# Patient Record
Sex: FEMALE | Race: WHITE
[De-identification: names, ages, dates, MRNs, and addresses within clinical notes are randomized per-mention and may not be internally consistent; named-entity substitution may affect disease eponyms.]

---

## 2019-11-18 ENCOUNTER — HOSPITAL ENCOUNTER (OUTPATIENT)
Dept: HOSPITAL 19 - WSC | Age: 79
LOS: 32 days | Discharge: HOME | End: 2019-12-20
Attending: FAMILY MEDICINE
Payer: MEDICARE

## 2019-11-18 DIAGNOSIS — M17.0: Primary | ICD-10-CM

## 2020-03-09 ENCOUNTER — HOSPITAL ENCOUNTER (OUTPATIENT)
Dept: HOSPITAL 19 - COL.VAS | Age: 80
End: 2020-03-09
Attending: ORTHOPAEDIC SURGERY
Payer: MEDICARE

## 2020-03-09 DIAGNOSIS — Z96.651: ICD-10-CM

## 2020-03-09 DIAGNOSIS — M79.89: Primary | ICD-10-CM

## 2021-05-22 ENCOUNTER — HOSPITAL ENCOUNTER (EMERGENCY)
Dept: HOSPITAL 19 - COL.ER | Age: 81
Discharge: HOME | End: 2021-05-22
Attending: EMERGENCY MEDICINE
Payer: MEDICARE

## 2021-05-22 VITALS — WEIGHT: 164.24 LBS | HEIGHT: 64.02 IN | BODY MASS INDEX: 28.04 KG/M2

## 2021-05-22 VITALS — HEART RATE: 94 BPM

## 2021-05-22 VITALS — DIASTOLIC BLOOD PRESSURE: 74 MMHG | TEMPERATURE: 98.2 F | SYSTOLIC BLOOD PRESSURE: 136 MMHG
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
## 2021-05-22 DIAGNOSIS — M54.2: ICD-10-CM

## 2021-05-22 DIAGNOSIS — S00.83XA: Primary | ICD-10-CM

## 2022-03-16 ENCOUNTER — HOSPITAL ENCOUNTER (EMERGENCY)
Dept: HOSPITAL 19 - COL.ER | Age: 82
Discharge: HOME | End: 2022-03-16
Attending: FAMILY MEDICINE
Payer: MEDICARE

## 2022-03-16 VITALS — TEMPERATURE: 98.3 F

## 2022-03-16 VITALS — HEIGHT: 64.02 IN | WEIGHT: 162.26 LBS | BODY MASS INDEX: 27.7 KG/M2

## 2022-03-16 VITALS — SYSTOLIC BLOOD PRESSURE: 162 MMHG | DIASTOLIC BLOOD PRESSURE: 85 MMHG | HEART RATE: 78 BPM

## 2022-03-16 DIAGNOSIS — R05.9: Primary | ICD-10-CM

## 2022-03-26 ENCOUNTER — HOSPITAL ENCOUNTER (OUTPATIENT)
Dept: HOSPITAL 19 - COL.ER | Age: 82
End: 2022-03-26
Attending: PERSONAL EMERGENCY RESPONSE ATTENDANT
Payer: MEDICARE

## 2022-03-26 VITALS — SYSTOLIC BLOOD PRESSURE: 116 MMHG | HEART RATE: 76 BPM | DIASTOLIC BLOOD PRESSURE: 79 MMHG | TEMPERATURE: 97.9 F

## 2022-03-26 DIAGNOSIS — Z48.02: Primary | ICD-10-CM

## 2022-04-22 ENCOUNTER — HOSPITAL ENCOUNTER (INPATIENT)
Dept: HOSPITAL 19 - COL.ER | Age: 82
LOS: 1 days | Discharge: HOME | DRG: 842 | End: 2022-04-23
Attending: INTERNAL MEDICINE | Admitting: INTERNAL MEDICINE
Payer: MEDICARE

## 2022-04-22 VITALS — BODY MASS INDEX: 28.04 KG/M2 | HEIGHT: 64.02 IN | WEIGHT: 164.24 LBS

## 2022-04-22 VITALS — HEART RATE: 78 BPM | SYSTOLIC BLOOD PRESSURE: 138 MMHG | TEMPERATURE: 98.2 F | DIASTOLIC BLOOD PRESSURE: 53 MMHG

## 2022-04-22 VITALS — DIASTOLIC BLOOD PRESSURE: 87 MMHG | HEART RATE: 70 BPM | SYSTOLIC BLOOD PRESSURE: 110 MMHG

## 2022-04-22 VITALS — HEART RATE: 73 BPM | DIASTOLIC BLOOD PRESSURE: 61 MMHG | SYSTOLIC BLOOD PRESSURE: 126 MMHG

## 2022-04-22 VITALS — SYSTOLIC BLOOD PRESSURE: 120 MMHG | HEART RATE: 81 BPM | TEMPERATURE: 98.5 F | DIASTOLIC BLOOD PRESSURE: 50 MMHG

## 2022-04-22 VITALS — DIASTOLIC BLOOD PRESSURE: 72 MMHG | SYSTOLIC BLOOD PRESSURE: 130 MMHG | TEMPERATURE: 98 F | HEART RATE: 76 BPM

## 2022-04-22 VITALS — HEART RATE: 75 BPM | DIASTOLIC BLOOD PRESSURE: 50 MMHG | SYSTOLIC BLOOD PRESSURE: 112 MMHG

## 2022-04-22 VITALS — DIASTOLIC BLOOD PRESSURE: 62 MMHG | TEMPERATURE: 97.4 F | SYSTOLIC BLOOD PRESSURE: 129 MMHG | HEART RATE: 81 BPM

## 2022-04-22 VITALS — SYSTOLIC BLOOD PRESSURE: 116 MMHG | HEART RATE: 73 BPM | TEMPERATURE: 98.4 F | DIASTOLIC BLOOD PRESSURE: 74 MMHG

## 2022-04-22 VITALS — DIASTOLIC BLOOD PRESSURE: 74 MMHG | SYSTOLIC BLOOD PRESSURE: 152 MMHG | HEART RATE: 85 BPM

## 2022-04-22 VITALS — DIASTOLIC BLOOD PRESSURE: 63 MMHG | SYSTOLIC BLOOD PRESSURE: 140 MMHG | HEART RATE: 68 BPM

## 2022-04-22 VITALS — DIASTOLIC BLOOD PRESSURE: 77 MMHG | SYSTOLIC BLOOD PRESSURE: 113 MMHG | HEART RATE: 70 BPM

## 2022-04-22 VITALS — DIASTOLIC BLOOD PRESSURE: 93 MMHG | HEART RATE: 76 BPM | SYSTOLIC BLOOD PRESSURE: 107 MMHG

## 2022-04-22 DIAGNOSIS — Z90.710: ICD-10-CM

## 2022-04-22 DIAGNOSIS — Z88.0: ICD-10-CM

## 2022-04-22 DIAGNOSIS — G25.81: ICD-10-CM

## 2022-04-22 DIAGNOSIS — Z88.2: ICD-10-CM

## 2022-04-22 DIAGNOSIS — I25.10: ICD-10-CM

## 2022-04-22 DIAGNOSIS — J30.2: ICD-10-CM

## 2022-04-22 DIAGNOSIS — I10: ICD-10-CM

## 2022-04-22 DIAGNOSIS — Z96.653: ICD-10-CM

## 2022-04-22 DIAGNOSIS — Z88.5: ICD-10-CM

## 2022-04-22 DIAGNOSIS — R07.89: ICD-10-CM

## 2022-04-22 DIAGNOSIS — E78.5: ICD-10-CM

## 2022-04-22 DIAGNOSIS — D47.02: Primary | ICD-10-CM

## 2022-04-22 DIAGNOSIS — F32.A: ICD-10-CM

## 2022-04-22 LAB
ALBUMIN SERPL-MCNC: 4.4 GM/DL (ref 3.4–4.8)
ALP SERPL-CCNC: 48 U/L (ref 40–150)
ALT SERPL-CCNC: 14 U/L (ref 0–55)
ANION GAP SERPL CALC-SCNC: 12 MMOL/L (ref 7–16)
AST SERPL-CCNC: 16 U/L (ref 5–34)
BASOPHILS # BLD: 0.1 K/MM3 (ref 0–0.2)
BASOPHILS NFR BLD AUTO: 1.2 % (ref 0–2)
BILIRUB SERPL-MCNC: 0.7 MG/DL (ref 0.2–1.2)
BUN SERPL-MCNC: 12 MG/DL (ref 10–20)
CALCIUM SERPL-MCNC: 9.5 MG/DL (ref 8.4–10.2)
CHLORIDE SERPL-SCNC: 106 MMOL/L (ref 98–107)
CO2 SERPL-SCNC: 22 MMOL/L (ref 23–31)
CREAT SERPL-SCNC: 0.91 MG/DL (ref 0.57–1.11)
EOSINOPHIL # BLD: 0.2 K/MM3 (ref 0–0.7)
EOSINOPHIL NFR BLD: 3.5 % (ref 0–4)
ERYTHROCYTE [DISTWIDTH] IN BLOOD BY AUTOMATED COUNT: 12 % (ref 11.5–14.5)
GLUCOSE SERPL-MCNC: 100 MG/DL (ref 70–99)
GRANULOCYTES # BLD AUTO: 47.8 % (ref 42.2–75.2)
HCT VFR BLD AUTO: 40.2 % (ref 37–47)
HGB BLD-MCNC: 14.1 G/DL (ref 12.5–16)
LIPASE SERPL-CCNC: 19 U/L (ref 8–78)
LYMPHOCYTES # BLD: 1.8 K/MM3 (ref 1.2–3.4)
LYMPHOCYTES NFR BLD: 37 % (ref 20–51)
MCH RBC QN AUTO: 33 PG (ref 27–31)
MCHC RBC AUTO-ENTMCNC: 35 G/DL (ref 33–37)
MCV RBC AUTO: 93 FL (ref 80–100)
MONOCYTES # BLD: 0.5 K/MM3 (ref 0.1–0.6)
MONOCYTES NFR BLD AUTO: 10.3 % (ref 1.7–9.3)
NEUTROPHILS # BLD: 2.3 K/MM3 (ref 1.4–6.5)
PLATELET # BLD AUTO: 261 K/MM3 (ref 130–400)
PMV BLD AUTO: 9.5 FL (ref 7.4–10.4)
POTASSIUM SERPL-SCNC: 4 MMOL/L (ref 3.5–4.5)
PROT SERPL-MCNC: 7.1 GM/DL (ref 6.2–8.1)
RBC # BLD AUTO: 4.34 M/MM3 (ref 4.1–5.3)
SODIUM SERPL-SCNC: 140 MMOL/L (ref 136–145)
TROPONIN I SERPL-MCNC: < 0.01 NG/ML (ref 0–0.03)

## 2022-04-22 PROCEDURE — 4A023N7 MEASUREMENT OF CARDIAC SAMPLING AND PRESSURE, LEFT HEART, PERCUTANEOUS APPROACH: ICD-10-PCS | Performed by: INTERNAL MEDICINE

## 2022-04-22 PROCEDURE — B2111ZZ FLUOROSCOPY OF MULTIPLE CORONARY ARTERIES USING LOW OSMOLAR CONTRAST: ICD-10-PCS | Performed by: INTERNAL MEDICINE

## 2022-04-22 NOTE — NUR
PT C/0 BACK PAIN DESCRIBED AS SHARP IN L UPPER BACK. RESOLVED IN A FEW
SECONDS. BP CUFF THEN WENT OFF FOR VITALS AND PT SUDDENLY HAD C/O L SIDED CP
WHICH ALSO RESOLVED IN A FEW SECONDS. PT STATES THIS WAS THE PAIN SHE WAS
HAVING LAST NIGHT AND THIS MORNING. 2ML OF AIR REMOVED FROM TR BAND, NO
LEAKING OR BLEEDING. SITE SOFT. LES[facility] DAHL

## 2022-04-22 NOTE — NUR
PT ARRIVED TO FLOOR, VITALS ATTACHED TO PT, ASSESSMENT PERFORMED, PT DENIES
PAIN, PT ORIENTED TO ROOM, PT ORDERED MEAL, NO OTHER NEEDS

## 2022-04-22 NOTE — NUR
Pt began complaining of chest pain around 2300. I got a set of VS. 65 HR, NSR
on tele, 123/65 BP, 96% on room air, and 16RR. Pt denied SOB, dizziness. No
diaphoresis noted. Stated the pain was "chest pain not heart pain" and was
causing the back of her neck to hurt, and her stomach to hurt. The pt stated
feeling bloated and burped while I was in the room. Notified the provider. No
new changes yet, but will make changes at ordered. Will continue to monitor
the pt.

## 2022-04-22 NOTE — NUR
BAND DEFLATED BY 3ML WITH MUKUND NIGHT SHIFT RN DURING BEDSIDE SHIFT REPORT, PT
NOT COMPLAINING OF CHEST PAIN AT THIS TIME. SITTING UP EATING DINNER. 7ML OF
AIR LEFT IN TR BAND, WRIST SOFT W/O TENDERNESS

## 2022-04-22 NOTE — NUR
TR band was at 10 ml at shift change. Day shift RN removed 3ml. I re-assessed
15 mins later and removed another 3 ml. No drainage, site soft. Removed
another 2 ml, and re-assessed later.  Re-assessed again about 20 mins later
and removed the last 2 ml's now. Site is clean and dry, soft. No bleeding or
drainage. Will continue to re-assess every 15 mins.

## 2022-04-22 NOTE — NUR
Pt states chest pain stopped about 5 minutes after it began. Provider
notified, and new medication orders placed. Will administer as ordered.

## 2022-04-22 NOTE — NUR
Pt alert and oriented x4 this evening. At shift change pt was sitting up in
bed eating dinner. Has since then laid down and is now resting. Pt reported a
headache and feeling "yucky" and being unable to sleep. Notified the provider
and tylenol prn and melatonin were ordered and administered to the pt per
orders. TR band is off the right radial site. Site remains soft, with 2+
radial pulse, fingers and warm, pt denies numbess/tingling in fingers. Band
was removed around 2000 after releasing air at 15 minute intervals. No
drainage or edema at the site. Pt denies pain at the site. Pt continues to
deny chest pain. Reviewed med rx with pt. She is a poor historian in regards
to medications. Seems to know most of the medications she is on and knows when
she last took them, but is unsure of dosages. Shift assessment completed.
Medications administered per orders. Completed post-op vital signs. Vital
signs remain stable. Pt reports no questions, will continue to monitor.

## 2022-04-22 NOTE — NUR
PATIENT ALERT AND ORIENTED  AND DENIES CHEST PAIN,  CONSENT SIGNED AND PATIENT
VERBALIZES UNDERSTANDING PROCEDURE. ER TO CALL REPORT TO FLOOR RN. FAMILY
TAKES PATIENT BELONGINGS HOME.  PLEASE  SEE MERGE  FOR CATH DOCUMENTATION AS
WELL  AS MEDICATION ADMINISTRATION  AND HEMODYNAMIC  MONITORING.

## 2022-04-23 VITALS — TEMPERATURE: 98 F | DIASTOLIC BLOOD PRESSURE: 56 MMHG | HEART RATE: 68 BPM | SYSTOLIC BLOOD PRESSURE: 108 MMHG

## 2022-04-23 VITALS — HEART RATE: 63 BPM | TEMPERATURE: 97.6 F | SYSTOLIC BLOOD PRESSURE: 108 MMHG | DIASTOLIC BLOOD PRESSURE: 50 MMHG

## 2022-04-23 VITALS — SYSTOLIC BLOOD PRESSURE: 104 MMHG | HEART RATE: 71 BPM | DIASTOLIC BLOOD PRESSURE: 66 MMHG | TEMPERATURE: 97.6 F

## 2022-04-23 VITALS — HEART RATE: 63 BPM | TEMPERATURE: 97.4 F | DIASTOLIC BLOOD PRESSURE: 43 MMHG | SYSTOLIC BLOOD PRESSURE: 110 MMHG

## 2022-04-23 LAB
ANION GAP SERPL CALC-SCNC: 8 MMOL/L (ref 7–16)
BASOPHILS # BLD: 0.1 K/MM3 (ref 0–0.2)
BASOPHILS NFR BLD AUTO: 1.2 % (ref 0–2)
BUN SERPL-MCNC: 13 MG/DL (ref 10–20)
CALCIUM SERPL-MCNC: 8.7 MG/DL (ref 8.4–10.2)
CHLORIDE SERPL-SCNC: 108 MMOL/L (ref 98–107)
CHOLEST SPEC-SCNC: 188 MG/DL (ref 0–199)
CHOLEST/HDLC SERPL-SRTO: 3.6
CO2 SERPL-SCNC: 21 MMOL/L (ref 23–31)
CREAT SERPL-SCNC: 0.76 MG/DL (ref 0.57–1.11)
EOSINOPHIL # BLD: 0.2 K/MM3 (ref 0–0.7)
EOSINOPHIL NFR BLD: 4.5 % (ref 0–4)
ERYTHROCYTE [DISTWIDTH] IN BLOOD BY AUTOMATED COUNT: 11.9 % (ref 11.5–14.5)
GLUCOSE SERPL-MCNC: 79 MG/DL (ref 70–99)
GRANULOCYTES # BLD AUTO: 45.5 % (ref 42.2–75.2)
HCT VFR BLD AUTO: 35.6 % (ref 37–47)
HDLC SERPL-MCNC: 52 MG/DL (ref 40–60)
HGB BLD-MCNC: 12 G/DL (ref 12.5–16)
LDLC SERPL-MCNC: 117 MG/DL
LYMPHOCYTES # BLD: 1.9 K/MM3 (ref 1.2–3.4)
LYMPHOCYTES NFR BLD: 37.6 % (ref 20–51)
MCH RBC QN AUTO: 32 PG (ref 27–31)
MCHC RBC AUTO-ENTMCNC: 34 G/DL (ref 33–37)
MCV RBC AUTO: 95 FL (ref 80–100)
MONOCYTES # BLD: 0.6 K/MM3 (ref 0.1–0.6)
MONOCYTES NFR BLD AUTO: 11.2 % (ref 1.7–9.3)
NEUTROPHILS # BLD: 2.3 K/MM3 (ref 1.4–6.5)
PLATELET # BLD AUTO: 213 K/MM3 (ref 130–400)
PMV BLD AUTO: 9.6 FL (ref 7.4–10.4)
POTASSIUM SERPL-SCNC: 3.7 MMOL/L (ref 3.5–4.5)
RBC # BLD AUTO: 3.73 M/MM3 (ref 4.1–5.3)
SODIUM SERPL-SCNC: 137 MMOL/L (ref 136–145)
TRIGL SERPL-MCNC: 97 MG/DL (ref 0–149)

## 2022-04-23 NOTE — NUR
DISCHARGE EDUCATION PROVIDED, IV REMOVED, TELE REMOVED, PT ESCORTED OUT VIA
WHEELCHAIR WITH PT BELONGINGS. NO OTHER NEEDS

## 2022-04-23 NOTE — NUR
Pt remained alert and oriented overnight. Pt appears to have difficulty
sleeping, has been sitting up on side of bed all night. Reports she is having
difficulty sleeping d/t her restless legs. No more reports of chest pain,
except for the one instance this evening (see previous notes) . Pt has not
reported any new itching. IV fluids continue into LAC IV. Vital signs remain
stable and pt remains in NSR. Pt is currently sitting up on side of bed and is
moving her legs around continuosly. Provided pt with nourishment throughout
the night. Pt does not report any questions at this time, will continue to
monitor.

## 2022-04-23 NOTE — NUR
PT MARIA ELENA, AOX4, DENIES PAIN, REPORTS NO CHEST PAIN SINCE [phone]. PT
HAVING FRIEND BRING IN MEDICATIONS FOR MED REC. WILL CALL PHARMACY FOR LIST
WHEN IT OPENS. ASSESSMENT PERFORMED, MEDICATIONS GIVEN. PT DENIES N/V/D. NO
OTHER NEEDS

## 2022-04-23 NOTE — NUR
Pt reports to me that she has began itching, and that she thinks the symptoms
she is having are due to a mast cell flare up. Provider is aware. Will
continue to monitor.